# Patient Record
Sex: MALE | Race: WHITE | NOT HISPANIC OR LATINO | URBAN - NONMETROPOLITAN AREA
[De-identification: names, ages, dates, MRNs, and addresses within clinical notes are randomized per-mention and may not be internally consistent; named-entity substitution may affect disease eponyms.]

---

## 2025-07-11 ENCOUNTER — APPOINTMENT (OUTPATIENT)
Dept: PRIMARY CARE | Facility: CLINIC | Age: 30
End: 2025-07-11

## 2025-07-11 VITALS
HEIGHT: 73 IN | BODY MASS INDEX: 31.69 KG/M2 | DIASTOLIC BLOOD PRESSURE: 88 MMHG | WEIGHT: 239.1 LBS | OXYGEN SATURATION: 97 % | SYSTOLIC BLOOD PRESSURE: 140 MMHG | HEART RATE: 64 BPM

## 2025-07-11 DIAGNOSIS — B07.0 PLANTAR WART OF LEFT FOOT: Primary | ICD-10-CM

## 2025-07-11 PROCEDURE — 1036F TOBACCO NON-USER: CPT | Performed by: FAMILY MEDICINE

## 2025-07-11 PROCEDURE — 17110 DESTRUCTION B9 LES UP TO 14: CPT | Performed by: FAMILY MEDICINE

## 2025-07-11 ASSESSMENT — PATIENT HEALTH QUESTIONNAIRE - PHQ9
SUM OF ALL RESPONSES TO PHQ9 QUESTIONS 1 AND 2: 0
2. FEELING DOWN, DEPRESSED OR HOPELESS: NOT AT ALL
1. LITTLE INTEREST OR PLEASURE IN DOING THINGS: NOT AT ALL

## 2025-07-11 NOTE — PROGRESS NOTES
"Subjective   Patient ID: J Carlos Flores is a 29 y.o. male who presents for Establish Care and Annual Exam.  HPI  Patient Health Questionnaire-2 Score: 0 (7/11/2025  9:20 AM)     ?plantar wart 2 moths ago running and pad of foot throbbing   Tried craeam and cmpd W     Left foot   Patient tried over-the-counter freezing medication and creams.  Would like treated with Verruca-Freeze.    With a #15 blade shave down callus of about 1.5 cm over the left ball of the foot.  Used a stylette to get a freeze up for about 2 minutes.    Review of Systems    Objective   Visit Vitals  /88   Pulse 64   Ht 1.854 m (6' 1\")   Wt 108 kg (239 lb 1.6 oz) Comment: steel toe boots   SpO2 97%   BMI 31.55 kg/m²   Smoking Status Never   BSA 2.36 m²       Physical Exam  Neurological:      General: No focal deficit present.      Mental Status: He is alert.   Psychiatric:         Mood and Affect: Mood normal.              Assessment/Plan   Diagnoses and all orders for this visit:  Plantar wart of left foot       If this does not take care of it we will refer to podiatry.    Santo Amezquita MD 07/11/25 9:47 AM   "